# Patient Record
Sex: MALE | Race: WHITE
[De-identification: names, ages, dates, MRNs, and addresses within clinical notes are randomized per-mention and may not be internally consistent; named-entity substitution may affect disease eponyms.]

---

## 2018-05-26 ENCOUNTER — HOSPITAL ENCOUNTER (EMERGENCY)
Dept: HOSPITAL 62 - ER | Age: 38
LOS: 1 days | Discharge: HOME | End: 2018-05-27
Payer: COMMERCIAL

## 2018-05-26 DIAGNOSIS — R03.0: ICD-10-CM

## 2018-05-26 DIAGNOSIS — R53.83: ICD-10-CM

## 2018-05-26 DIAGNOSIS — R50.9: ICD-10-CM

## 2018-05-26 DIAGNOSIS — R39.89: ICD-10-CM

## 2018-05-26 DIAGNOSIS — B34.9: Primary | ICD-10-CM

## 2018-05-26 DIAGNOSIS — M79.1: ICD-10-CM

## 2018-05-26 DIAGNOSIS — F17.200: ICD-10-CM

## 2018-05-26 DIAGNOSIS — R11.2: ICD-10-CM

## 2018-05-26 DIAGNOSIS — R51: ICD-10-CM

## 2018-05-26 DIAGNOSIS — D69.6: ICD-10-CM

## 2018-05-26 DIAGNOSIS — R00.0: ICD-10-CM

## 2018-05-26 DIAGNOSIS — J06.9: ICD-10-CM

## 2018-05-26 LAB
ADD MANUAL DIFF: NO
ALBUMIN SERPL-MCNC: 4.6 G/DL (ref 3.5–5)
ALP SERPL-CCNC: 70 U/L (ref 38–126)
ALT SERPL-CCNC: 38 U/L (ref 21–72)
ANION GAP SERPL CALC-SCNC: 11 MMOL/L (ref 5–19)
APPEARANCE UR: (no result)
APTT PPP: YELLOW S
AST SERPL-CCNC: 22 U/L (ref 17–59)
BASOPHILS # BLD AUTO: 0 10^3/UL (ref 0–0.2)
BASOPHILS NFR BLD AUTO: 0.3 % (ref 0–2)
BILIRUB DIRECT SERPL-MCNC: 0.2 MG/DL (ref 0–0.4)
BILIRUB SERPL-MCNC: 1.1 MG/DL (ref 0.2–1.3)
BILIRUB UR QL STRIP: NEGATIVE
BUN SERPL-MCNC: 22 MG/DL (ref 7–20)
CALCIUM: 9.3 MG/DL (ref 8.4–10.2)
CHLORIDE SERPL-SCNC: 101 MMOL/L (ref 98–107)
CK SERPL-CCNC: 115 U/L (ref 55–170)
CO2 SERPL-SCNC: 27 MMOL/L (ref 22–30)
EOSINOPHIL # BLD AUTO: 0 10^3/UL (ref 0–0.6)
EOSINOPHIL NFR BLD AUTO: 0 % (ref 0–6)
ERYTHROCYTE [DISTWIDTH] IN BLOOD BY AUTOMATED COUNT: 14.1 % (ref 11.5–14)
GLUCOSE SERPL-MCNC: 102 MG/DL (ref 75–110)
GLUCOSE UR STRIP-MCNC: NEGATIVE MG/DL
HCT VFR BLD CALC: 43.9 % (ref 37.9–51)
HGB BLD-MCNC: 15 G/DL (ref 13.5–17)
KETONES UR STRIP-MCNC: (no result) MG/DL
LIPASE SERPL-CCNC: 35.4 U/L (ref 23–300)
LYMPHOCYTES # BLD AUTO: 1.2 10^3/UL (ref 0.5–4.7)
LYMPHOCYTES NFR BLD AUTO: 8.7 % (ref 13–45)
MCH RBC QN AUTO: 29.5 PG (ref 27–33.4)
MCHC RBC AUTO-ENTMCNC: 34.1 G/DL (ref 32–36)
MCV RBC AUTO: 86 FL (ref 80–97)
MONOCYTES # BLD AUTO: 0.8 10^3/UL (ref 0.1–1.4)
MONOCYTES NFR BLD AUTO: 6.1 % (ref 3–13)
NEUTROPHILS # BLD AUTO: 11.5 10^3/UL (ref 1.7–8.2)
NEUTS SEG NFR BLD AUTO: 84.9 % (ref 42–78)
NITRITE UR QL STRIP: NEGATIVE
PH UR STRIP: 5 [PH] (ref 5–9)
PLATELET # BLD: 129 10^3/UL (ref 150–450)
POTASSIUM SERPL-SCNC: 4.2 MMOL/L (ref 3.6–5)
PROT SERPL-MCNC: 7.1 G/DL (ref 6.3–8.2)
PROT UR STRIP-MCNC: 30 MG/DL
RBC # BLD AUTO: 5.08 10^6/UL (ref 4.35–5.55)
SODIUM SERPL-SCNC: 138.7 MMOL/L (ref 137–145)
SP GR UR STRIP: 1.03
TOTAL CELLS COUNTED % (AUTO): 100 %
UROBILINOGEN UR-MCNC: 4 MG/DL (ref ?–2)
WBC # BLD AUTO: 13.6 10^3/UL (ref 4–10.5)

## 2018-05-26 PROCEDURE — 83690 ASSAY OF LIPASE: CPT

## 2018-05-26 PROCEDURE — 71046 X-RAY EXAM CHEST 2 VIEWS: CPT

## 2018-05-26 PROCEDURE — 81001 URINALYSIS AUTO W/SCOPE: CPT

## 2018-05-26 PROCEDURE — 85025 COMPLETE CBC W/AUTO DIFF WBC: CPT

## 2018-05-26 PROCEDURE — 36415 COLL VENOUS BLD VENIPUNCTURE: CPT

## 2018-05-26 PROCEDURE — 82550 ASSAY OF CK (CPK): CPT

## 2018-05-26 PROCEDURE — 80053 COMPREHEN METABOLIC PANEL: CPT

## 2018-05-26 PROCEDURE — 96361 HYDRATE IV INFUSION ADD-ON: CPT

## 2018-05-26 PROCEDURE — 99284 EMERGENCY DEPT VISIT MOD MDM: CPT

## 2018-05-26 PROCEDURE — 87077 CULTURE AEROBIC IDENTIFY: CPT

## 2018-05-26 PROCEDURE — 87070 CULTURE OTHR SPECIMN AEROBIC: CPT

## 2018-05-26 PROCEDURE — 96375 TX/PRO/DX INJ NEW DRUG ADDON: CPT

## 2018-05-26 PROCEDURE — 87880 STREP A ASSAY W/OPTIC: CPT

## 2018-05-26 PROCEDURE — 96374 THER/PROPH/DIAG INJ IV PUSH: CPT

## 2018-05-26 NOTE — ER DOCUMENT REPORT
ED Flu Like





- General


Chief Complaint: Flu Symptoms


Stated Complaint: FLU LIKE SYMPTOMS


Time Seen by Provider: 05/26/18 19:51


Mode of Arrival: Ambulatory


Information source: Patient





- HPI


Patient complains to provider of: FEVER, ST, BODYACHES


Onset: Yesterday


Notes: 





Patient is here with complaints of fever, body aches, sore throat, vomiting.  

Patient states that yesterday he was pretty fishing for a [employer] and 

was on the water for about 8 hours.  States that he did drink some water and 

sweet tea.  By time he got home he was feeling somewhat ill.  States that he 

had a sore throat had some mild body aches and had a mild cough.  Woke up this 

morning feeling somewhat worse.  Took some ibuprofen was feeling somewhat 

better so he went to his cardiac [employer] tournament this morning.  While he was 

on the water he developed generalized body aches, nausea, vomiting.  He states 

that he vomited 3-4 times.  He denies any abdominal pain.  States that this 

time he feels sore throat with fever, body aches and generalized fatigue.  

Complains of some intermittent headache.  No neck stiffness.  No blurred or 

loss vision.  No unilateral numbness, tingling, weakness.  No chest pain or 

shortness of breath.  No rash.  No dysuria or hematuria.  He does report that 

his urine looks darker than normal.  He denies any chronic medical problems.  

He is on no daily medications.  He denies any known sick contacts.  He denies 

any other complaints at this time.





- Related Data


Allergies/Adverse Reactions: 


 





No Known Allergies Allergy (Unverified 05/26/18 19:26)


 











Past Medical History





- General


Information source: Patient





- Social History


Smoking Status: Current Every Day Smoker


Family History: Reviewed & Not Pertinent





Review of Systems





- Review of Systems


-: Yes All other systems reviewed and negative





Physical Exam





- Vital signs


Vitals: 


 











Temp Pulse Resp BP Pulse Ox


 


 98.3 F   117 H  20   126/61 H  98 


 


 05/26/18 19:33  05/26/18 19:33  05/26/18 19:33  05/26/18 19:33  05/26/18 19:33














- Notes


Notes: 





GENERAL: alert, cooperative, nontoxic, no distress.


HEAD: normocephalic, atraumatic


EYES: conjunctiva pink without discharge, no external redness or swelling.


EARS: no external swelling, no external redness, no mastoid redness, swelling, 

tenderness.  Ear canals are clear without swelling or drainage.  TMs pearly gray

, no redness, no bulging, normal landmarks, no perforation.


NOSE: atraumatic, no external swelling. clear rhinorrhea noted.


MOUTH/THROAT: mucous membranes moist and pink, posterior pharynx without 

erythema, swelling, exudate. No trismus or drooling.


NECK: soft, supple, full range of motion, no meningismus.


CHEST: no distress, lungs clear and equal throughout.  No wheezing, rales, 

rhonchi.


CARDIAC: regular rhythm, mild tachycardia.  No murmur, normal capillary refill, 

normal pulses.  No peripheral edema noted.


BACK: full range of motion, no CVA tenderness.


abdo: Soft, nontender.  No rebound tenderness or guarding.  No mass.


EXTREMITIES: full range of motion of all extremities.  No redness, no swelling.


NEURO: alert and oriented A&O3, no focal deficits, full range of motion of all 

extremities.  Cranial nerves II through XII are grossly intact.


PYSCH: appropriate mood, affect.  Patient is cooperative.


SKIN: pink, warm, dry, no rash.





Course





- Re-evaluation


Re-evalutation: 





05/27/18 00:25


Patient is nontoxic appearing with stable vitals.  Is here with complaints of 

flulike symptoms including body aches, fever, sore throat, vomiting.  This 

started last night.  He initially was mildly tachycardic.  After IV fluids and 

Toradol and Zofran the patient states that he feels significantly better.  

Rapid strep is negative.  White blood cell count slightly elevated at 13.  

Platelet count is slightly low at 129.  Electrolytes, CPK, LFTs are all 

unremarkable.  Urinalysis shows signs of dehydration but no signs of infection.

  Rapid strep is negative.  This point the patient likely has a viral syndrome.

  Potentially he could have influenza.  Patient is not in the high risk 

treatment group according to the CDC, therefore I did not test for influenza as 

this would not change our treatment plan for this patient.  This point the 

patient states he is feeling significant better.  He will be discharged home 

with a prescription for Naprosyn and Zofran.  Follow-up with his primary care 

doctor at the next available appointment.  Follow-up sooner for increased pain, 

persistent vomiting, difficulty breathing or swallowing, or for any further 

concerns.





The patient is noted to have elevated blood pressure during today's emergency 

department visit.  The patient was informed of this finding.  The patient was 

instructed that this may be related to pre-hypertension and requires further 

evaluation with a primary care provider.  The patient has no hypertensive 

symptoms at this time.





The patient's emergency department workup and current diagnosis were explained 

to the patient and or family.  Follow-up instructions were provided.  

Medications if prescribed were discussed. Instructions for when to return to 

the emergency department including specific  worrisome symptoms were discussed 

with the patient and/or family.


05/27/18 00:27








- Vital Signs


Vital signs: 


 











Temp Pulse Resp BP Pulse Ox


 


 98.3 F   117 H  20   126/61 H  98 


 


 05/26/18 19:33  05/26/18 19:33  05/26/18 19:33  05/26/18 19:33  05/26/18 19:33














- Laboratory


Result Diagrams: 


 05/26/18 20:33





 05/26/18 20:33


Laboratory results interpreted by me: 


 











  05/26/18 05/26/18 05/26/18





  20:33 20:33 22:15


 


WBC  13.6 H  


 


RDW  14.1 H  


 


Plt Count  129 L  


 


Seg Neutrophils %  84.9 H  


 


Lymphocytes %  8.7 L  


 


Absolute Neutrophils  11.5 H  


 


BUN   22 H 


 


Urine Protein    30 H


 


Urine Ketones    TRACE H


 


Urine Urobilinogen    4.0 H














- Diagnostic Test


Radiology reviewed: Image reviewed, Reports reviewed - No acute abnormality





Discharge





- Discharge


Clinical Impression: 


 Viral syndrome, Myalgia, Thrombocytopenia





Vomiting


Qualifiers:


 Vomiting type: unspecified Vomiting Intractability: non-intractable Nausea 

presence: with nausea Qualified Code(s): R11.2 - Nausea with vomiting, 

unspecified





Condition: Stable


Disposition: HOME, SELF-CARE


Instructions:  Antinausea Medication ([facility]), Vomiting ([facility]), Viral Syndrome ([facility])

, Intravenous (IV) Fluids ([facility])


Additional Instructions: 


Take medications as prescribed.  Drink plenty of fluids.  Follow-up with your 

doctor at the next available appointment for recheck.  Your platelet count was 

slightly low at 129,000, this can be related to viral illnesses.  Should be 

rechecked with your primary care doctor at some point.  Follow-up sooner if you 

have any worsening symptoms, persistent vomiting, difficulty breathing or 

swelling, severe abdominal pain, or for any further concerns.





Your blood pressure was elevated during today's visit.  Have this rechecked 

with your doctor.


Prescriptions: 


Naproxen [Naprosyn] 500 mg PO BID #20 tablet


Ondansetron HCl [Zofran 4 mg Tablet] 1 - 2 tab PO Q4H PRN #10 tablet


 PRN Reason: 


Forms:  Elevated Blood Pressure, Smoking Cessation Education


Referrals: 


[facility] [Provider Group] - Follow up as needed

## 2018-05-26 NOTE — RADIOLOGY REPORT (SQ)
EXAM DESCRIPTION:  CHEST 2 VIEWS



COMPLETED DATE/TIME:  5/26/2018 8:52 pm



REASON FOR STUDY:  cough fever



COMPARISON:  None.



EXAM PARAMETERS:  NUMBER OF VIEWS: two views

TECHNIQUE: Digital Frontal and Lateral radiographic views of the chest acquired.

RADIATION DOSE: NA

LIMITATIONS: none



FINDINGS:  LUNGS AND PLEURA: No opacities, masses or pneumothorax. No pleural effusion.

MEDIASTINUM AND HILAR STRUCTURES: No masses or contour abnormalities.

HEART AND VASCULAR STRUCTURES: Heart normal size.  No evidence for failure.

BONES: No acute findings.

HARDWARE: None in the chest.

OTHER: No other significant finding.



IMPRESSION:  NO ACUTE RADIOGRAPHIC FINDING IN THE CHEST.



TECHNICAL DOCUMENTATION:  JOB ID:  [id number]

 2011 [employer]- All Rights Reserved



Reading location - IP/workstation name: [email or web address]

## 2018-05-26 NOTE — ER DOCUMENT REPORT
ED Medical Screen (RME)





- General


Chief Complaint: Flu Symptoms


Stated Complaint: FLU LIKE SYMPTOMS


Time Seen by Provider: 05/26/18 19:51


Mode of Arrival: Ambulatory


Information source: Patient


Notes: 





38-year-old male presents to ED for complaint of body aches joint pain headache 

sore throat and feeling terrible.  He states he was out on the water fishing 

yesterday for 10 hours and this morning he woke up at 0430 to start his fishing 

tournament and had a temperature of 101.  He states he took ibuprofen and then 

felt a little better so he went out fishing.  He states he was only on a water 

a little while before he started vomiting and feeling terrible.  He states he 

took his temperature and the highest temp today was 102.  He states he has been 

drinking Pedialyte and taken ibuprofen for his fever but he still feels 

terrible.  Patient is alert and oriented temperature right now is 99.1 with a 

pulse of 109 and a sat of 98.  In the waiting room his blood pressure was 126/

61 with a temp of 98 3 pulse of 117 respirations of 20 and a sat of 100%.  

Respirations are regular even unlabored and lungs are clear at this time.











I have greeted and performed a rapid initial assessment of this patient.  A 

comprehensive ED assessment and evaluation of the patient, analysis of test 

results and completion of medical decision making process will be conducted by 

an additional ED providers.





- Related Data


Allergies/Adverse Reactions: 


 





No Known Allergies Allergy (Unverified 05/26/18 19:26)


 











Physical Exam





- Vital signs


Vitals: 





 











Temp Pulse Resp BP Pulse Ox


 


 98.3 F   117 H  20   126/61 H  98 


 


 05/26/18 19:33  05/26/18 19:33  05/26/18 19:33  05/26/18 19:33  05/26/18 19:33














Course





- Vital Signs


Vital signs: 





 











Temp Pulse Resp BP Pulse Ox


 


 98.3 F   117 H  20   126/61 H  98 


 


 05/26/18 19:33  05/26/18 19:33  05/26/18 19:33  05/26/18 19:33  05/26/18 19:33

## 2018-05-27 VITALS — DIASTOLIC BLOOD PRESSURE: 52 MMHG | SYSTOLIC BLOOD PRESSURE: 116 MMHG
